# Patient Record
Sex: FEMALE | Race: WHITE | NOT HISPANIC OR LATINO | URBAN - METROPOLITAN AREA
[De-identification: names, ages, dates, MRNs, and addresses within clinical notes are randomized per-mention and may not be internally consistent; named-entity substitution may affect disease eponyms.]

---

## 2020-09-11 ENCOUNTER — APPOINTMENT (RX ONLY)
Dept: URBAN - METROPOLITAN AREA CLINIC 349 | Facility: CLINIC | Age: 75
Setting detail: DERMATOLOGY
End: 2020-09-11

## 2020-09-11 DIAGNOSIS — Z12.83 ENCOUNTER FOR SCREENING FOR MALIGNANT NEOPLASM OF SKIN: ICD-10-CM

## 2020-09-11 DIAGNOSIS — L82.0 INFLAMED SEBORRHEIC KERATOSIS: ICD-10-CM

## 2020-09-11 DIAGNOSIS — L30.9 DERMATITIS, UNSPECIFIED: ICD-10-CM

## 2020-09-11 DIAGNOSIS — L82.1 OTHER SEBORRHEIC KERATOSIS: ICD-10-CM

## 2020-09-11 PROCEDURE — 11301 SHAVE SKIN LESION 0.6-1.0 CM: CPT

## 2020-09-11 PROCEDURE — A4550 SURGICAL TRAYS: HCPCS

## 2020-09-11 PROCEDURE — ? PRESCRIPTION

## 2020-09-11 PROCEDURE — 11302 SHAVE SKIN LESION 1.1-2.0 CM: CPT

## 2020-09-11 PROCEDURE — ? PATHOLOGY BILLING

## 2020-09-11 PROCEDURE — ? COUNSELING

## 2020-09-11 PROCEDURE — 99202 OFFICE O/P NEW SF 15 MIN: CPT | Mod: 25

## 2020-09-11 PROCEDURE — ? TREATMENT REGIMEN

## 2020-09-11 PROCEDURE — ? SHAVE REMOVAL

## 2020-09-11 PROCEDURE — 88305 TISSUE EXAM BY PATHOLOGIST: CPT

## 2020-09-11 RX ORDER — TRIAMCINOLONE ACETONIDE 1 MG/G
CREAM TOPICAL
Qty: 1 | Refills: 2 | Status: ERX | COMMUNITY
Start: 2020-09-11

## 2020-09-11 RX ORDER — TRIAMCINOLONE ACETONIDE 1 MG/G
CREAM TOPICAL
Qty: 1 | Refills: 2 | Status: ACTIVE

## 2020-09-11 RX ADMIN — TRIAMCINOLONE ACETONIDE: 1 CREAM TOPICAL at 00:00

## 2020-09-11 ASSESSMENT — LOCATION SIMPLE DESCRIPTION DERM
LOCATION SIMPLE: SCALP
LOCATION SIMPLE: LEFT CHEEK
LOCATION SIMPLE: LEFT FOREARM
LOCATION SIMPLE: RIGHT BUTTOCK
LOCATION SIMPLE: RIGHT CALF
LOCATION SIMPLE: RIGHT BUTTOCK
LOCATION SIMPLE: RIGHT LOWER BACK
LOCATION SIMPLE: RIGHT ELBOW
LOCATION SIMPLE: RIGHT ELBOW
LOCATION SIMPLE: LEFT FOREARM
LOCATION SIMPLE: RIGHT LOWER BACK
LOCATION SIMPLE: RIGHT CALF
LOCATION SIMPLE: LEFT UPPER BACK
LOCATION SIMPLE: LEFT UPPER BACK
LOCATION SIMPLE: RIGHT CHEEK
LOCATION SIMPLE: SCALP
LOCATION SIMPLE: RIGHT LOWER BACK
LOCATION SIMPLE: RIGHT CHEEK

## 2020-09-11 ASSESSMENT — LOCATION DETAILED DESCRIPTION DERM
LOCATION DETAILED: RIGHT SUPERIOR LATERAL LOWER BACK
LOCATION DETAILED: RIGHT PROXIMAL CALF
LOCATION DETAILED: LEFT MID-UPPER BACK
LOCATION DETAILED: RIGHT ELBOW
LOCATION DETAILED: RIGHT LATERAL BUCCAL CHEEK
LOCATION DETAILED: RIGHT SUPERIOR LATERAL LOWER BACK
LOCATION DETAILED: RIGHT ELBOW
LOCATION DETAILED: LEFT MID-UPPER BACK
LOCATION DETAILED: LEFT LATERAL BUCCAL CHEEK
LOCATION DETAILED: RIGHT BUTTOCK
LOCATION DETAILED: RIGHT BUTTOCK
LOCATION DETAILED: RIGHT LATERAL BUCCAL CHEEK
LOCATION DETAILED: LEFT PROXIMAL DORSAL FOREARM
LOCATION DETAILED: RIGHT SUPERIOR LATERAL LOWER BACK
LOCATION DETAILED: LEFT CENTRAL PARIETAL SCALP
LOCATION DETAILED: LEFT PROXIMAL DORSAL FOREARM
LOCATION DETAILED: RIGHT PROXIMAL CALF
LOCATION DETAILED: LEFT CENTRAL PARIETAL SCALP

## 2020-09-11 ASSESSMENT — LOCATION ZONE DERM
LOCATION ZONE: ARM
LOCATION ZONE: LEG
LOCATION ZONE: TRUNK
LOCATION ZONE: TRUNK
LOCATION ZONE: FACE
LOCATION ZONE: FACE
LOCATION ZONE: SCALP
LOCATION ZONE: SCALP
LOCATION ZONE: TRUNK
LOCATION ZONE: ARM
LOCATION ZONE: FACE
LOCATION ZONE: LEG

## 2020-09-11 NOTE — PROCEDURE: PATHOLOGY BILLING
Immunohistochemistry ([id number] and [id number]) billing is not performed here. Please use the Immunohistochemistry Stain Billing plan to accomplish this.

## 2020-09-11 NOTE — PROCEDURE: PATHOLOGY BILLING
Immunohistochemistry ([id number] and [id number]) billing is not performed here. Please use the Immunohistochemistry Stain Billing plan to accomplish this. Immunohistochemistry ([id number] and [id number]) billing is not performed here. Please use the Immunohistochemistry Stain Billing plan to accomplish this.

## 2020-09-11 NOTE — PROCEDURE: TREATMENT REGIMEN
Initiate Treatment: triamcinolone acetonide 0.1 % topical cream \\nDays Supply: 30\\nSig: Apply to affected area on body bid when flared. Avoid face and folds
Detail Level: Detailed

## 2020-09-11 NOTE — PROCEDURE: SHAVE REMOVAL
Was A Bandage Applied: Yes
Accession #: [id number] [email or web address]
Size Of Lesion In Cm (Required): 0.6
Notification Instructions: Patient will be notified of biopsy results. However, patient instructed to call the office if not contacted within 2 weeks.
Render Path Notes In Note?: No
X Size Of Lesion In Cm (Optional): 0
Biopsy Method: Dermablade
Hemostasis: Electrodesiccation
Billing Type: Third-Party Bill
Post-Care Instructions: I reviewed with the patient in detail post-care instructions. Patient is to keep the biopsy site dry overnight, and then apply vaseline twice daily until healed. Patient may apply hydrogen peroxide soaks to remove any crusting. After the procedure, the patient was observed for 5-10 minutes and was oriented to person, place and time and demied feeling dizzy, queasy, and stated that they did not feel that they were going to faint.
Medical Necessity Clause: This procedure was medically necessary because the lesion that was treated was: changing
Anesthesia Type: 2% lidocaine with epinephrine
Detail Level: Detailed
Wound Care: Vaseline
Anesthesia Volume In Cc: 0.5
Consent was obtained from the patient. The risks and benefits to therapy were discussed in detail. Specifically, the risks of infection, scarring, bleeding, prolonged wound healing, incomplete removal, allergy to anesthesia, nerve injury and recurrence were addressed. Prior to the procedure, the treatment site was clearly identified and confirmed by the patient. All components of Universal Protocol/PAUSE Rule completed.
Medical Necessity Information: It is in your best interest to select a reason for this procedure from the list below. All of these items fulfill various CMS LCD requirements except the new and changing color options.
Hemostasis: Electrocautery

## 2020-09-11 NOTE — PROCEDURE: SHAVE REMOVAL
Render Post-Care Instructions In Note?: yes
X Size Of Lesion In Cm (Optional): 0
Accession #: [id number] [email or web address]
Anesthesia Volume In Cc: 0.5
Detail Level: Detailed
Anesthesia Type: 2% lidocaine with epinephrine
Medical Necessity Information: It is in your best interest to select a reason for this procedure from the list below. All of these items fulfill various CMS LCD requirements except the new and changing color options.
Bill 72446 For Specimen Handling/Conveyance To Laboratory?: no
Consent was obtained from the patient. The risks and benefits to therapy were discussed in detail. Specifically, the risks of infection, scarring, bleeding, prolonged wound healing, incomplete removal, allergy to anesthesia, nerve injury and recurrence were addressed. Prior to the procedure, the treatment site was clearly identified and confirmed by the patient. All components of Universal Protocol/PAUSE Rule completed.
Biopsy Method: Dermablade
Hemostasis: Electrodesiccation
Wound Care: Vaseline
Size Of Lesion In Cm (Required): 1.5
Size Of Lesion In Cm (Required): 0.6
Billing Type: Third-Party Bill
Notification Instructions: Patient will be notified of biopsy results. However, patient instructed to call the office if not contacted within 2 weeks.
Medical Necessity Clause: This procedure was medically necessary because the lesion that was treated was: changing
Post-Care Instructions: I reviewed with the patient in detail post-care instructions. Patient is to keep the biopsy site dry overnight, and then apply vaseline twice daily until healed. Patient may apply hydrogen peroxide soaks to remove any crusting. After the procedure, the patient was observed for 5-10 minutes and was oriented to person, place and time and demied feeling dizzy, queasy, and stated that they did not feel that they were going to faint.

## 2021-03-02 ENCOUNTER — HOSPITAL ENCOUNTER (OUTPATIENT)
Dept: PHYSICAL THERAPY | Age: 76
Discharge: HOME | End: 2021-03-02
Payer: MEDICARE

## 2021-03-02 PROCEDURE — 97162 PT EVAL MOD COMPLEX 30 MIN: CPT

## 2021-03-02 NOTE — PROGRESS NOTES
Kim Glover  : 1945  Primary: Anjum Christie Medicare Choice *  Secondary:  [facility] [place] [address] [facility], [address], [place] [address].  [phone]   [phone]       OUTPATIENT PHYSICAL THERAPY: Daily Treatment Note 3/2/2021   Visit Count:  Visit count could not be calculated. Make sure you are using a visit which is associated with an episode. ICD-10: Treatment Diagnosis: Lack of coordination (muscle incoordination) (R27.8), Pelvic floor dysfunction in female (M62.98), Pelvic and perineal pain (R10.2), Frequency of micturition (R35.0), Mixed incontinence (Urge and stress incontinence) (N39.46) and Pain in right hip (M25.551)    Pre-treatment Symptoms/Complaints:  Mixed urinary incontinence (urge > stress)  Pain: Initial:   0/10 Post Session:  0/10   Medications Last Reviewed:  3/2/2021  Updated Objective Findings:  See evaluation note from today   TREATMENT:   THERAPEUTIC ACTIVITY: ( 10 minutes): Therapeutic activities per grid below to improve independence with bladder control and urge suppression strategies   TE= therapeutic exercise, TA= therapeutic activity, MT= manual therapy, NE= neuro re-education   Date:  3/2/21 Date:   Date:     Activity/Exercise Parameters Parameters Parameters   Pt Edu: Role of PFM in support, stability, sphincteric, and sexual function. POC Reviewed. HEP provided. Pt edu on urge suppression strategies - deep breathing, distraction, triggers to avoid                                           Pt gives verbal consent to internal vaginal assessment/treatment. Pt's daughter present for exam.  *Student present for exam.    Treatment/Session Summary:    · Response to Treatment:  Pt reports good understanding of plan of care, as well as prescribed home exercise program.  All questions were answered to pt's satisfaction. Pt was invited to call with any further questions or concerns.   · Communication/Consultation:  None today  · Equipment provided today:  None today  · Recommendations/Intent for next treatment session: Next visit will focus on MSK screen (lumbar screen, hip strength and ROM, balance assessment), and coordination of PFM, timed voiding. Total Treatment Billable Duration: Evaluation 40 minutes, treatment 10 minutes     Hi Martinez, SPT     No future appointments.

## 2021-03-02 NOTE — THERAPY EVALUATION
Kim Glover : 1945 Primary: [employer] Medicare Choice * Secondary:  [facility] [address], [address], [place] [address]. [phone]   [phone] OUTPATIENT PHYSICAL THERAPY:Initial Assessment 3/2/2021 ICD-10: Treatment Diagnosis: Lack of coordination (muscle incoordination) (R27.8), Pelvic floor dysfunction in female (M62.98), Pelvic and perineal pain (R10.2), Frequency of micturition (R35.0), Mixed incontinence (Urge and stress incontinence) (N39.46) and Pain in right hip (M25.551) Precautions/Allergies:  
Patient has no allergy information on record. TREATMENT PLAN: 
Effective Dates: 3/2/2021 TO 2021 (90 days). Frequency/Duration: 1 time a week for 90 Day(s) MEDICAL/REFERRING DIAGNOSIS: 
Mixed incontinence [N39.46] DATE OF ONSET: few years ago REFERRING PHYSICIAN: SCOTTIE Garcia MD Orders: Evaluate and treat Return MD Appointment:  
 
INITIAL ASSESSMENT: Sita Maldonado presents with musculoskeletal limitations including reduced hip ROM, reduced coordination and awareness of PFM, reduced hip strength and decreased pelvic floor muscle (PFM) strength. These limitations are impairing the patient's ability to properly coordinate perineal elevation and descent for normalized PFM function, contributing to voiding dysfunction including UUI, KONSTANTIN, urinary urgency and frequency. As a result, she is limited in her ability to be fully independent with bladder control and to complete tasks without having to stop to go to the bathroom. I believe the patient will benefit from PFPT in order to address the above mentioned deficits. PROBLEM LIST (Impacting functional limitations): 1. Decreased Strength 2. Decreased ADL/Functional Activities 3. Increased Pain 4. Decreased [place] with Home Exercise Program 
5.  Decreased strength of pelvic floor which limits bladder control INTERVENTIONS PLANNED: (Treatment may consist of any combination of the following) 1. Biofeedback as needed 2. Bladder retraining 3. Bladder education 4. Home Exercise Program (HEP) 5. Manual Therapy 6. Neuromuscular Re-education/Strengthening 7. Range of Motion (ROM) 8. Therapeutic Activites 9. Therapeutic Exercise/Strengthening GOALS: (Goals have been discussed and agreed upon with patient.) Short-Term Functional Goals: Time Frame: 3-4 weeks 1. Pt will demonstrate independence with basic PFM HEP to improve awareness, coordination, and timing of PFM. 2. Pt will demonstrate understanding of and ability to teach back appropriate water intake, bladder irritants, toileting frequency, and positioning for improved self-management of symptoms. 3. Pt will demonstrate ability to isolate a pelvic floor contraction without breath holding and minimal to no accessory muscle use in order to implement the knack and/or urge suppression, reducing pad usage by 50%. 4. Pt will demonstrate ability to perform diaphragmatic breathing and quick flick pelvic floor contractions in order to implement urge suppression and reduce episodes of UI by 50%. Discharge Goals: Time Frame: 12 weeks 1. Pt will demonstrate ability to voluntarily contract the pelvic floor muscles prior to a cough or sneeze for decreased leakage during increases in intra-abdominal pressure. 2. Pt will demonstrate independence with an advanced HEP for general conditioning, core stabilization, and mobility to facilitate carry over and independent management of symptoms. 3. Pt will be independent in implementation of a timed voiding schedule and use of urge suppression to reduce urinary frequency to 6-8/day and 0/night. 4. Pt will improve outcome score by 20/100. OUTCOME MEASURE:  
Tool Used: Pelvic Floor Impact Questionnaireshort form 7 (PFIQ-7) Score:  Initial: 3/2/21 · Bladder or Urine: 100/100 · Bowel or Rectum: 66/100 · Vagina or Pelvis: 0/100 Most Recent: X (Date: -- ) · Bladder or Urine: X 
· Bowel or Rectum: X · Vagina or Pelvis: X Interpretation of Score: Each of the 7 sections is scored on a scale from 0-3; 0 representing \"Not at all\", 3 representing \"Quite a bit\". The mean value is taken from all the answered items, then multiplied by 100 to obtain the scale score, ranging from 0-100. This process is repeated for each column representing bowel, bladder, and pelvic pain. MEDICAL NECESSITY:  
· Patient is expected to demonstrate progress in strength and coordination to increase independence with bladder control. REASON FOR SERVICES/OTHER COMMENTS: 
· Patient requires skilled intervention to address the above mentioned deficits. Total Duration: 
  50 minutes Rehabilitation Potential For Stated Goals: Good Regarding [facility] therapy, I certify that the treatment plan above will be carried out by a therapist or under their direction. Thank you for this referral, 
JOVITA Jones Referring Physician Signature: SCOTTIE Buckner  
 
PAIN/SUBJECTIVE:  
Initial:   0/10 Post Session:  0/10 HISTORY:  
History of Injury/Illness (Reason for Referral): Ms. Herb Lopez is a 79 yo female referred to pelvic floor physical therapy (PFPT) by SCOTTIE Buckner 2/2 due to mixed urinary incontinence. Pt reports that symptoms began years ago. Previous treatment includes nothing. Has had OPPT following hand surgery. Pt has history of recurrent UTIs, most recent was in January 2021. Pt reports she has had UUI for several years along with KONSTANTIN symptoms (leakage with coughing, sneezing, laughing), urinary urgency, and urinary frequency. Triggers for feelings of urgency include running water, turning the key in the door when going into the house, and anytime she is getting closer to the bathroom. Pt also reports constipation which she has had for a few years, but reports no pushing or straining when having a BM.  
 
Pt states she has an annoying pain in her groin bilaterally, feels like it's over her ovaries, worsens when laying down. Pt states she had a partial hysterectomy and still has her ovaries. Pt has R hip pain, L knee pain where she fell and broke her knee. Numbness and tingling present in both feet mainly at night (R leg spontaneously jerks, toes curl under, feet get numb). Hands also sometimes feel numb. Pt had a stroke 2 years ago but reports she is not dealing with any residual deficits from that episode. Note: Pt's daughter reports pt has some dementia and has had poor compliance with PT in the past. Urinary: Frequency >1x/hour, 2 x/night. Positive for stress urinary incontinence, urge urinary incontinence, urinary urgency, urinary frequency, incomplete emptying and nocturia. Negative for hematuria. Pt does use pads for urinary protection; 2-3 pads per day (PPD). Fluid intake: quart or more of water/day; bladder irritants include: coffee AM, soft drinks at restaurants. Pt does not limit fluid intake due to bladder control. Bowel: Positive for constipation. Negative for pain with bowel movement, pushing/straining with bowel movement and fecal incontinence. Pt does not use pads for bowel protection. Sexual: Pt is not sexually active. Pelvic Pain: Location: groin (bilateral). Worse with laying down. OB History: Number of pregnancies: 2 Number of vaginal deliveries: 2 (breech births) Number of c-sections: 0. Patient stated goals for PT: 
Patients goal(s) for PT are better bladder control. Past Medical History/Comorbidities: Ms. Gamal Nayak  has no past medical history on file. Ms. Gamal Nayak  has no past surgical history on file. Social History/Living Environment:  
Have you ever had any pelvic trauma (orthopedic in nature, fall, MVA, etc.)? NO Have you ever experienced any unwanted physical or sexual contact? NO Have you ever experienced any form of medical trauma (GYN, urological, GI, etc)?  
 
Pt lives with daughter. Alcohol use? Tobacco use? Prior Level of Function/Work/Activity: 
Prior level of function: [employer] Occupation: 
Exercise activities: no routine Personal Factors:   
      Sex:  female Age:  78 y.o. Risk Factors: 
     (4)  Confusion/Disorientation/Impulsivity Ability to Rise from Chair: 
     (1)  Pushes up, successful in one attempt Falls Prevention Plan: No modifications necessary Total: (5 or greater = High Risk): 2  
©2010 [facility] [employer] [facility] States Patent #[id number]. Federal Law prohibits the replication, distribution or use without written permission from [facility] [employer] Current Medications: No current outpatient medications on file. Date Last Reviewed: 3/2/2021 Number of Personal Factors/Comorbidities that affect the Plan of Care: 1-2: MODERATE COMPLEXITY EXAMINATION:  
External Observations:  Voluntary contraction: [x] absent     [] present Pt initially completed valsalva.  Involuntary contraction: [] absent     [] present  Involuntary relaxation: [] absent     [x] present  Perineal descent at rest: [x] absent     [] present  Perineal descent with bearing: [] absent     [x] present  Skin integrity: WNL Pelvic Floor Muscle (PFM) Assessment:  Vaginal vault size: [] decreased     [] increased     [x] WNL  Muscle volume: [] decreased     [] WNL     [x] Defect  PFM tension: [] decreased     [x] increased     [] WNL Contraction ability: 
Voluntary contraction: [x] absent     [x] weak     [] moderate      [] strong *Pt required verbal cues for proper performance of PFM contraction due to excessive breath holding and abdominal use. However, she improved with verbal cues to isolate a contraction. Voluntary relaxation: [] absent     [x] partial     [] complete MMT: 2/5 PFM endurance: DNT due to poor coordination Repetitions of endurance contractions: DNT Number of quick contractions in 10 seconds: Unable to complete at this time Quality of contractions: Poor Tissue laxity test: Anterior wall: [] minimum     [] moderate     [] severe      [] WNL Posterior wall: [] minimum     [] moderate     [] severe      [] WNL Palpation: via vaginal canal  
Superficial Pelvic Floor Musculature (PFM): Tender? Intermediate PFM Tender? Deep PFM Tender? Superficial Transverse Perineal [] Right  [] Left  []DNT Deep Transverse Perineal [] Right  [] Left  []DNT Puborectalis [] Right  [] Left  []DNT Ischiocavernosus [] Right  [x] Left  []DNT Compressor Urethra [] Right  [] Left  []DNT Pubococcygeus [] Right  [] Left  []DNT Bulbocavernosus [] Right  [] Left  []DNT   Ileococcygeus [] Right  [] Left  []DNT Obturator Internus [x] Right  [] Left  []DNT Coccygeus [] Right  [] Left  []DNT External palpation: Muscle/muscle group Tender? Adductors [] Right  [] Left  []DNT Gluteals [] Right  [] Left  []DNT Piriformis [] Right  [] Left  []DNT Iliopsoas [] Right  [] Left  []DNT Abdominal wall [x] Right  [] Left  []DNT (incision from inguinal hernia surgery) Pubic symphysis [] Right  [] Left  []DNT Breath assessment: 
Observation: chest breathing dominant Coordination of pelvic floor muscles with breath: paradoxical movement present Co-contraction of PFM with transversus abdominis: able with cuing Body Structures Involved: 1. Nerves 2. Endocrine 3. Muscles Body Functions Affected: 1. Sensory/Pain 2. Genitourinary 3. Reproductive 4. Neuromusculoskeletal 
5. Movement Related 6. Metobolic/Endocrine Activities and Participation Affected: 1. Learning and Applying Knowledge 2. Mobility 3. Self Care Number of elements (examined above) that affect the Plan of Care: 3: MODERATE COMPLEXITY CLINICAL PRESENTATION:  
Presentation: Stable and uncomplicated: LOW COMPLEXITY CLINICAL DECISION MAKING:  
Use of outcome tool(s) and clinical judgement create a POC that gives a: Questionable prediction of patient's progress: MODERATE COMPLEXITY

## 2021-03-12 ENCOUNTER — HOSPITAL ENCOUNTER (OUTPATIENT)
Dept: PHYSICAL THERAPY | Age: 76
Discharge: HOME | End: 2021-03-12
Payer: MEDICARE

## 2021-03-12 PROCEDURE — 97530 THERAPEUTIC ACTIVITIES: CPT

## 2021-03-12 PROCEDURE — 97110 THERAPEUTIC EXERCISES: CPT

## 2021-03-12 NOTE — PROGRESS NOTES
Kim Glover  : 1945  Primary: [employer] Medicare Choice *  Secondary:  [facility]  [address] [facility]  [phone]   [phone]       OUTPATIENT PHYSICAL THERAPY: Daily Treatment Note 3/12/2021   Visit Count:  2    ICD-10: Treatment Diagnosis: Lack of coordination (muscle incoordination) (R27.8), Pelvic floor dysfunction in female (M62.98), Pelvic and perineal pain (R10.2), Frequency of micturition (R35.0), Mixed incontinence (Urge and stress incontinence) (N39.46) and Pain in right hip (M25.551)    Pre-treatment Symptoms/Complaints:  Mixed urinary incontinence (urge > stress)    Urinary symptoms are better (1 PPD, 1 pad at night)  Urge suppression strategies have worked well for her  Yesterday had diarrhea    Pain: Initial:   010 Post Session:  Did not assess   Medications Last Reviewed:  3/12/2021  Updated Objective Findings:    MSK Exam (3/12/21)  MMT  Hip flexion: R 3/5, L 3/5 - pulling in the groin on both sides  Hip IR: R 3/5 (pain in low back/SIJ), L 4-/5  Hip ER: R 4/5, L 4-/5  Hip abduction: R 3/5 (pain in low back/SIJ), L 4+/5    Lumbar screen  Flexion: to shins (above ankles), pain in low back and R SIJ, aggravated acid reflux, dizzy, requested to sit  R scoliosis apparent with lumbar flexion and in sitting (lateral curvature of spine, R shoulder higher than L)    Functional leg length discrepancy - R leg longer than L in supine     TREATMENT:   THERAPEUTIC ACTIVITY: (25 minutes): Therapeutic activities per grid below to improve independence with bladder control and urge suppression strategies   TE= therapeutic exercise, TA= therapeutic activity, MT= manual therapy, NE= neuro re-education  THERAPEUTIC EXERCISE: (20 minutes): See below     Date:  3/2/21 Date:  3/12/21 Date:     Activity/Exercise Parameters Parameters Parameters   Pt Edu: Role of PFM in support, stability, sphincteric, and sexual function.  POC Reviewed. HEP provided. Pt edu on urge suppression strategies - deep breathing, distraction, triggers to avoid Pt edu on sleep position and use of pillows for postural support  10 min    Pt edu on lifting mechanics to avoid forward bending and resultant dizziness  10 min    HEP provided and reviewed: PFM contractions coordinated with breath    PFM activation  Coordination with breath  Coordination with TrA  x10                                    Pt gives verbal consent to internal vaginal assessment/treatment. Pt's daughter present for internal treatment. *Student present for internal treatment. Treatment/Session Summary:    · Response to Treatment: Pt demonstrates good understanding of HEP including PFM exercises and postural adjustments for sleeping and lifting positions. · Communication/Consultation:  None today  · Equipment provided today:  None today  · Recommendations/Intent for next treatment session: Next visit will focus on finishing lumbar screen and balance assessment, coordination of PFM, review of urge suppression techniques (quick flicks), timed voiding, trunk and hip stability exercises.   Total Treatment Billable Duration: Treatment 45 minutes     JOVITA Mujica     Future Appointments   Date Time Provider Farhat Ngueyni   3/24/2021  8:00 AM Aime Gordon, PT [facility]

## 2021-03-19 ENCOUNTER — HOSPITAL ENCOUNTER (OUTPATIENT)
Dept: PHYSICAL THERAPY | Age: 76
Discharge: HOME | End: 2021-03-19
Payer: MEDICARE

## 2021-03-19 PROCEDURE — 97530 THERAPEUTIC ACTIVITIES: CPT

## 2021-03-19 NOTE — PROGRESS NOTES
Kim Glover  : 1945  Primary: [employer] *  Secondary:  [facility] at [address] [place] [address], [address] [place] [address],[address], [address] [facility]  [phone]   [phone]       OUTPATIENT PHYSICAL THERAPY: Daily Treatment Note 3/19/2021   Visit Count:  3    ICD-10: Treatment Diagnosis: Lack of coordination (muscle incoordination) (R27.8), Pelvic floor dysfunction in female (M62.98), Pelvic and perineal pain (R10.2), Frequency of micturition (R35.0), Mixed incontinence (Urge and stress incontinence) (N39.46) and Pain in right hip (M25.551)    Pre-treatment Symptoms/Complaints:  Mixed urinary incontinence (urge > stress)  Pt states she is doing well with her exercises and feels things have improved. Urinary symptoms are better (1 PPD, 1 pad at night)    Pt denies discomfort, but feels unsteady on her feet, especially with sudden movements. Not regularly checking BP. Since surgery to remove cyst on saliva gland she feels her balance has been off. Pain: Initial:   0/10 Post Session:  Did not assess   Medications Last Reviewed:  3/19/2021  Updated Objective Findings:  /80 (pre-tx) 147/74 (following standing balance exercise)  BPM: 56    MSK Exam (3/12/21)  MMT  Hip flexion: R 3/5, L 3/5 - pulling in the groin on both sides  Hip IR: R 3/5 (pain in low back/SIJ), L 4-/5  Hip ER: R 4/5, L 4-/5  Hip abduction: R 3/5 (pain in low back/SIJ), L 4+/5    Lumbar screen (3/12/21, 3/19/21)  Flexion: to shins (above ankles), pain in low back and R SIJ, aggravated acid reflux, dizzy, requested to sit  R scoliosis apparent with lumbar flexion and in sitting (lateral curvature of spine, R shoulder higher than L)  Bilateral side-bending restricted with compensatory rotational movement. Functional leg length discrepancy - R leg longer than L in supine     TREATMENT:   THERAPEUTIC ACTIVITY: (55 minutes):   Therapeutic activities per grid below to improve independence with bladder control and urge suppression strategies   TE= therapeutic exercise, TA= therapeutic activity, MT= manual therapy, NE= neuro re-education    THERAPEUTIC EXERCISE: (  minutes): See below     Date:  3/2/21 Date:  3/12/21 Date:  3/19/21   Activity/Exercise Parameters Parameters Parameters   Pt Edu: Role of PFM in support, stability, sphincteric, and sexual function. POC Reviewed. HEP provided. Pt edu on urge suppression strategies - deep breathing, distraction, triggers to avoid Pt edu on sleep position and use of pillows for postural support  10 min    Pt edu on lifting mechanics to avoid forward bending and resultant dizziness  10 min    HEP provided and reviewed: PFM contractions coordinated with breath Pt edu re BP management    PFM activation  Coordination with breath  Coordination with TrA  x10 Coordination with breath and PFM    Coordination with TrA  2 x 5 reps, verbal and tactile cues   Standing balance exercise    Feet together, level surface and foam - postural sway in all positions, LOB after 15 sec on foam, unable to close eyees                           Pt edu on lifting mechanics to avoid forward bending and resultant dizziness  10 min    HEP provided and reviewed: PFM contractions coordinated with breath  Pt gives verbal consent to internal vaginal assessment/treatment. Pt's daughter present for internal treatment. *Student present for internal treatment. Treatment/Session Summary:    · Response to Treatment: Pt required heavy cuing for coordination of PF contraction with exhalation as well as for activate of TrA. Difficulty maintaining standing balance without relying on vision and wide base of support. BP elevated post standing balance compared to at beginning of session. Therapist monitored for sx's and advised checks be completed when symptomatic. Daughter verbalized understanding and states she been monitoring use of BP meds as prescribed.    · Communication/Consultation:  None today  · Equipment provided today:  None today  · Recommendations/Intent for next treatment session: Next visit will focus on finishing lumbar screen and balance assessment, coordination of PFM, review of urge suppression techniques (quick flicks), timed voiding, trunk and hip stability exercises.   Total Treatment Billable Duration: Treatment 55 minutes     Jarvis Bravo, PT     Future Appointments   Date Time Provider Farhat Levin   3/24/2021  8:00 AM [facility]

## 2022-01-19 ENCOUNTER — APPOINTMENT (RX ONLY)
Dept: URBAN - METROPOLITAN AREA CLINIC 349 | Facility: CLINIC | Age: 77
Setting detail: DERMATOLOGY
End: 2022-01-19

## 2022-01-19 DIAGNOSIS — Z71.89 OTHER SPECIFIED COUNSELING: ICD-10-CM

## 2022-01-19 DIAGNOSIS — L57.8 OTHER SKIN CHANGES DUE TO CHRONIC EXPOSURE TO NONIONIZING RADIATION: ICD-10-CM

## 2022-01-19 DIAGNOSIS — D22 MELANOCYTIC NEVI: ICD-10-CM

## 2022-01-19 DIAGNOSIS — L82.1 OTHER SEBORRHEIC KERATOSIS: ICD-10-CM

## 2022-01-19 DIAGNOSIS — L85.3 XEROSIS CUTIS: ICD-10-CM

## 2022-01-19 PROBLEM — C44.319 BASAL CELL CARCINOMA OF SKIN OF OTHER PARTS OF FACE: Status: ACTIVE | Noted: 2022-01-19

## 2022-01-19 PROBLEM — D22.5 MELANOCYTIC NEVI OF TRUNK: Status: ACTIVE | Noted: 2022-01-19

## 2022-01-19 PROBLEM — D22.62 MELANOCYTIC NEVI OF LEFT UPPER LIMB, INCLUDING SHOULDER: Status: ACTIVE | Noted: 2022-01-19

## 2022-01-19 PROBLEM — D22.71 MELANOCYTIC NEVI OF RIGHT LOWER LIMB, INCLUDING HIP: Status: ACTIVE | Noted: 2022-01-19

## 2022-01-19 PROCEDURE — 11102 TANGNTL BX SKIN SINGLE LES: CPT

## 2022-01-19 PROCEDURE — ? BIOPSY BY SHAVE METHOD

## 2022-01-19 PROCEDURE — ? EDUCATIONAL RESOURCES PROVIDED

## 2022-01-19 PROCEDURE — 99213 OFFICE O/P EST LOW 20 MIN: CPT | Mod: 25

## 2022-01-19 PROCEDURE — ? COUNSELING

## 2022-01-19 ASSESSMENT — LOCATION SIMPLE DESCRIPTION DERM
LOCATION SIMPLE: LEFT FOREARM
LOCATION SIMPLE: LEFT CHEEK
LOCATION SIMPLE: ABDOMEN
LOCATION SIMPLE: RIGHT UPPER BACK
LOCATION SIMPLE: RIGHT CHEEK
LOCATION SIMPLE: RIGHT CALF
LOCATION SIMPLE: RIGHT BACK
LOCATION SIMPLE: RIGHT THIGH
LOCATION SIMPLE: RIGHT PRETIBIAL REGION

## 2022-01-19 ASSESSMENT — LOCATION DETAILED DESCRIPTION DERM
LOCATION DETAILED: RIGHT PROXIMAL CALF
LOCATION DETAILED: LEFT LATERAL MALAR CHEEK
LOCATION DETAILED: RIGHT INFERIOR MEDIAL UPPER BACK
LOCATION DETAILED: RIGHT ANTERIOR DISTAL THIGH
LOCATION DETAILED: RIGHT SUPERIOR LATERAL UPPER BACK
LOCATION DETAILED: LEFT VENTRAL PROXIMAL FOREARM
LOCATION DETAILED: LEFT LATERAL ABDOMEN
LOCATION DETAILED: RIGHT MEDIAL UPPER BACK
LOCATION DETAILED: RIGHT INFERIOR CENTRAL MALAR CHEEK
LOCATION DETAILED: RIGHT DISTAL PRETIBIAL REGION
LOCATION DETAILED: EPIGASTRIC SKIN

## 2022-01-19 ASSESSMENT — LOCATION ZONE DERM
LOCATION ZONE: FACE
LOCATION ZONE: TRUNK
LOCATION ZONE: LEG
LOCATION ZONE: ARM

## 2022-01-19 NOTE — PROCEDURE: BIOPSY BY SHAVE METHOD
Validate That Anesthesia Is Not 0: No
Detail Level: Detailed
Size Of Lesion In Cm: 0
Electrodesiccation Text: The wound bed was treated with electrodesiccation after the biopsy was performed.
Wound Care: Vaseline
Biopsy Method: Dermablade
Anesthesia Type: 2% lidocaine with epinephrine
Validate Note Data (See Information Below): Yes
Hemostasis: Electrodesiccation
Cryotherapy Text: The wound bed was treated with cryotherapy after the biopsy was performed.
Silver Nitrate Text: The wound bed was treated with silver nitrate after the biopsy was performed.
Notification Instructions: Patient will be notified of biopsy results. However, patient instructed to call the office if not contacted within 2 weeks.
Information: Selecting Yes will display possible errors in your note based on the variables you have selected. This validation is only offered as a suggestion for you. PLEASE NOTE THAT THE VALIDATION TEXT WILL BE REMOVED WHEN YOU FINALIZE YOUR NOTE. IF YOU WANT TO FAX A PRELIMINARY NOTE YOU WILL NEED TO TOGGLE THIS TO 'NO' IF YOU DO NOT WANT IT IN YOUR FAXED NOTE.
Consent: Written consent was obtained and risks were reviewed including but not limited to scarring, infection, bleeding, scabbing, incomplete removal, nerve damage and allergy to anesthesia.
Path Notes (To The Dermatopathologist): Check margins
Billing Type: Third-Party Bill
Biopsy Type: H and E
Dressing: Band-Aid
Curettage Text: The wound bed was treated with curettage after the biopsy was performed.
Anesthesia Volume In Cc (Will Not Render If 0): 1
Electrodesiccation And Curettage Text: The wound bed was treated with electrodesiccation and curettage after the biopsy was performed.
Post-Care Instructions: I reviewed with the patient in detail post-care instructions. Patient is to keep the biopsy site dry overnight, and then apply bacitracin twice daily until healed. Patient may apply hydrogen peroxide soaks to remove any crusting.
Depth Of Biopsy: dermis
Type Of Destruction Used: Curettage

## 2022-01-19 NOTE — HPI: SKIN LESION
How Severe Is Your Skin Lesion?: mild
Is This A New Presentation, Or A Follow-Up?: Skin Lesion
Additional History: Pt is here for a skin check. She has several lesions of concern and one new lesions on hEr forehead. She denies any bleeding. Pt denies family or personal history of melanoma.

## 2023-10-13 NOTE — PROCEDURE: PATHOLOGY BILLING
(V5) oriented Rendering Text In Billing: The slides were read, and reported in an attached document.